# Patient Record
Sex: MALE | Race: BLACK OR AFRICAN AMERICAN | NOT HISPANIC OR LATINO | Employment: FULL TIME | URBAN - METROPOLITAN AREA
[De-identification: names, ages, dates, MRNs, and addresses within clinical notes are randomized per-mention and may not be internally consistent; named-entity substitution may affect disease eponyms.]

---

## 2019-05-19 ENCOUNTER — HOSPITAL ENCOUNTER (EMERGENCY)
Facility: HOSPITAL | Age: 44
Discharge: HOME | End: 2019-05-19
Attending: EMERGENCY MEDICINE
Payer: COMMERCIAL

## 2019-05-19 VITALS
BODY MASS INDEX: 35.37 KG/M2 | RESPIRATION RATE: 18 BRPM | HEIGHT: 76 IN | WEIGHT: 290.44 LBS | TEMPERATURE: 99 F | SYSTOLIC BLOOD PRESSURE: 157 MMHG | DIASTOLIC BLOOD PRESSURE: 90 MMHG | OXYGEN SATURATION: 98 % | HEART RATE: 84 BPM

## 2019-05-19 DIAGNOSIS — R05.9 COUGH: ICD-10-CM

## 2019-05-19 DIAGNOSIS — R91.1 LUNG NODULE SEEN ON IMAGING STUDY: ICD-10-CM

## 2019-05-19 DIAGNOSIS — S33.5XXA SPRAIN OF LOW BACK, INITIAL ENCOUNTER: Primary | ICD-10-CM

## 2019-05-19 DIAGNOSIS — R42 DIZZINESS: ICD-10-CM

## 2019-05-19 LAB — POCT GLUCOSE: 122 MG/DL (ref 70–110)

## 2019-05-19 PROCEDURE — 99284 EMERGENCY DEPT VISIT MOD MDM: CPT

## 2019-05-19 PROCEDURE — 93005 ELECTROCARDIOGRAM TRACING: CPT

## 2019-05-19 PROCEDURE — 93010 ELECTROCARDIOGRAM REPORT: CPT | Mod: ,,, | Performed by: INTERNAL MEDICINE

## 2019-05-19 PROCEDURE — 93010 EKG 12-LEAD: ICD-10-PCS | Mod: ,,, | Performed by: INTERNAL MEDICINE

## 2019-05-19 PROCEDURE — 82962 GLUCOSE BLOOD TEST: CPT

## 2019-05-19 PROCEDURE — 25000003 PHARM REV CODE 250: Performed by: NURSE PRACTITIONER

## 2019-05-19 RX ORDER — DICLOFENAC SODIUM 50 MG/1
50 TABLET, DELAYED RELEASE ORAL 3 TIMES DAILY PRN
Qty: 15 TABLET | Refills: 0 | Status: SHIPPED | OUTPATIENT
Start: 2019-05-19

## 2019-05-19 RX ORDER — DEXAMETHASONE 4 MG/1
4 TABLET ORAL DAILY
Qty: 5 TABLET | Refills: 0 | Status: SHIPPED | OUTPATIENT
Start: 2019-05-19 | End: 2019-05-24

## 2019-05-19 RX ORDER — PROMETHAZINE HYDROCHLORIDE AND DEXTROMETHORPHAN HYDROBROMIDE 6.25; 15 MG/5ML; MG/5ML
5 SYRUP ORAL 3 TIMES DAILY PRN
Qty: 180 ML | Refills: 0 | Status: SHIPPED | OUTPATIENT
Start: 2019-05-19 | End: 2019-05-29

## 2019-05-19 RX ORDER — ORPHENADRINE CITRATE 100 MG/1
100 TABLET, EXTENDED RELEASE ORAL 2 TIMES DAILY PRN
Qty: 20 TABLET | Refills: 0 | Status: SHIPPED | OUTPATIENT
Start: 2019-05-19 | End: 2019-05-29

## 2019-05-19 RX ORDER — HYDROCODONE BITARTRATE AND ACETAMINOPHEN 5; 325 MG/1; MG/1
1 TABLET ORAL
Status: COMPLETED | OUTPATIENT
Start: 2019-05-19 | End: 2019-05-19

## 2019-05-19 RX ADMIN — HYDROCODONE BITARTRATE AND ACETAMINOPHEN 1 TABLET: 5; 325 TABLET ORAL at 08:05

## 2019-05-20 ENCOUNTER — TELEPHONE (OUTPATIENT)
Dept: PULMONOLOGY | Facility: CLINIC | Age: 44
End: 2019-05-20

## 2019-05-20 ENCOUNTER — OFFICE VISIT (OUTPATIENT)
Dept: PULMONOLOGY | Facility: CLINIC | Age: 44
End: 2019-05-20
Payer: COMMERCIAL

## 2019-05-20 VITALS
SYSTOLIC BLOOD PRESSURE: 130 MMHG | WEIGHT: 289.88 LBS | RESPIRATION RATE: 18 BRPM | HEART RATE: 84 BPM | OXYGEN SATURATION: 96 % | HEIGHT: 76 IN | BODY MASS INDEX: 35.3 KG/M2 | DIASTOLIC BLOOD PRESSURE: 83 MMHG

## 2019-05-20 DIAGNOSIS — R06.2 WHEEZING: ICD-10-CM

## 2019-05-20 DIAGNOSIS — R91.1 LUNG NODULE: Primary | ICD-10-CM

## 2019-05-20 DIAGNOSIS — R06.83 SNORING: ICD-10-CM

## 2019-05-20 DIAGNOSIS — R05.2 SUBACUTE COUGH: ICD-10-CM

## 2019-05-20 DIAGNOSIS — R29.818 SUSPECTED SLEEP APNEA: ICD-10-CM

## 2019-05-20 PROCEDURE — 3008F PR BODY MASS INDEX (BMI) DOCUMENTED: ICD-10-PCS | Mod: CPTII,S$GLB,, | Performed by: INTERNAL MEDICINE

## 2019-05-20 PROCEDURE — 3008F BODY MASS INDEX DOCD: CPT | Mod: CPTII,S$GLB,, | Performed by: INTERNAL MEDICINE

## 2019-05-20 PROCEDURE — 99999 PR PBB SHADOW E&M-EST. PATIENT-LVL III: CPT | Mod: PBBFAC,,, | Performed by: INTERNAL MEDICINE

## 2019-05-20 PROCEDURE — 99204 OFFICE O/P NEW MOD 45 MIN: CPT | Mod: S$GLB,,, | Performed by: INTERNAL MEDICINE

## 2019-05-20 PROCEDURE — 99204 PR OFFICE/OUTPT VISIT, NEW, LEVL IV, 45-59 MIN: ICD-10-PCS | Mod: S$GLB,,, | Performed by: INTERNAL MEDICINE

## 2019-05-20 PROCEDURE — 99999 PR PBB SHADOW E&M-EST. PATIENT-LVL III: ICD-10-PCS | Mod: PBBFAC,,, | Performed by: INTERNAL MEDICINE

## 2019-05-20 RX ORDER — ALBUTEROL SULFATE 90 UG/1
2 AEROSOL, METERED RESPIRATORY (INHALATION) EVERY 4 HOURS PRN
Qty: 18 G | Refills: 6 | Status: SHIPPED | OUTPATIENT
Start: 2019-05-20 | End: 2020-05-19

## 2019-05-20 NOTE — ED PROVIDER NOTES
SCRIBE #1 NOTE: I, Lai Mosley, am scribing for, and in the presence of, Nba Katz NP. I have scribed the entire note.       History     Chief Complaint   Patient presents with    Back Pain     R lower back pain    Cough     productive cough x 2 weeks.      Review of patient's allergies indicates:  No Known Allergies      History of Present Illness     HPI    5/19/2019, 7:33 PM  History obtained from the patient      History of Present Illness: [place] is a 43 y.o. male patient who presents to the Emergency Department for evaluation of right lumbar pain which onset yesterday. He reports it onset after picking up a lot of heavy bags yesterday. He also complains of a cough x2 weeks and b/l ear drainage. Symptoms are constant and moderate in severity. No mitigating or exacerbating factors reported. There are no other sxs. Patient denies any fever, chills, n/v/d, abd pain, SOB, wheezing, stridor, numbness, weakness, saddle anesthesia, incontinence, dysuria, sore throat, and all other sxs at this time. No further complaints or concerns at this time.     Arrival mode: Personal vehicle    PCP: Primary Doctor No        Past Medical History:  Past Medical History:   Diagnosis Date    Back pain        Past Surgical History:  History reviewed. No pertinent surgical history.      Family History:  History reviewed. No pertinent family history.     Social History:  Social History     Tobacco Use    Smoking status: Never Smoker    Smokeless tobacco: Never Used   Substance and Sexual Activity    Alcohol use: Not Currently    Drug use: Not Currently    Sexual activity: Unknown        Review of Systems     Review of Systems   Constitutional: Negative for chills and fever.   HENT: Positive for ear discharge. Negative for congestion, ear pain, sneezing and sore throat.    Respiratory: Positive for cough. Negative for shortness of breath, wheezing and stridor.    Cardiovascular: Negative for chest pain.  "  Gastrointestinal: Negative for abdominal pain, diarrhea, nausea and vomiting.   Genitourinary: Negative for dysuria and hematuria.   Musculoskeletal: Positive for back pain.   Skin: Negative for rash.   Neurological: Negative for dizziness, weakness, light-headedness, numbness and headaches.        (-) Incontinence, saddle anesthesia   Hematological: Does not bruise/bleed easily.   All other systems reviewed and are negative.       Physical Exam     Initial Vitals [05/19/19 1920]   BP Pulse Resp Temp SpO2   (!) 162/88 82 18 98.6 °F (37 °C) 95 %      MAP       --          Physical Exam  Nursing Notes and Vital Signs Reviewed.  Constitutional: Patient is in no acute distress. Well-developed and well-nourished.  Head: Atraumatic. Normocephalic.  Eyes: PERRL. EOM intact. Conjunctivae are not pale. No scleral icterus.  ENT: Mucous membranes are moist. Oropharynx is clear and symmetric.  Increased cerumen to b/l ear canals.  Neck: Supple. Full ROM. No lymphadenopathy.  Cardiovascular: Regular rate. Regular rhythm. No murmurs, rubs, or gallops. Distal pulses are 2+ and symmetric.  Pulmonary/Chest: No respiratory distress. Clear to auscultation bilaterally. No wheezing or rales.  Abdominal: Soft and non-distended.  There is no tenderness.  No rebound, guarding, or rigidity.   Musculoskeletal: Moves all extremities. No obvious deformities. No edema. No calf tenderness. Right sided lumbar tenderness.  Skin: Warm and dry.  Neurological:  Alert, awake, and appropriate.  Normal speech.  No acute focal neurological deficits are appreciated.  Psychiatric: Normal affect. Good eye contact. Appropriate in content.     ED Course   Procedures  ED Vital Signs:  Vitals:    05/19/19 1920 05/19/19 2053   BP: (!) 162/88 (!) 157/90   Pulse: 82 84   Resp: 18 18   Temp: 98.6 °F (37 °C)    TempSrc: Oral    SpO2: 95% 98%   Weight: 131.8 kg (290 lb 7.3 oz)    Height: 6' 4" (1.93 m)        Abnormal Lab Results:  Labs Reviewed   POCT GLUCOSE - " Abnormal; Notable for the following components:       Result Value    POCT Glucose 122 (*)     All other components within normal limits        All Lab Results:  Results for orders placed or performed during the hospital encounter of 05/19/19   POCT glucose   Result Value Ref Range    POCT Glucose 122 (H) 70 - 110 mg/dL         Imaging Results:  Imaging Results          X-Ray Chest PA And Lateral (Final result)  Result time 05/19/19 20:10:05    Final result by Hesham Whalen MD (05/19/19 20:10:05)                 Impression:      1.6 cm nodule projecting over the right mid lung.      Electronically signed by: Hesham Whalen MD  Date:    05/19/2019  Time:    20:10             Narrative:    EXAMINATION:  XR CHEST PA AND LATERAL    CLINICAL HISTORY:  Cough    COMPARISON:  None    FINDINGS:  The heart is normal in size.  There is a 1.6 cm nodule projecting over the right mid lung.  Lung fields otherwise clear.                               X-Ray Lumbar Spine Complete 5 View (Final result)  Result time 05/19/19 20:09:33    Final result by Hesham Whalen MD (05/19/19 20:09:33)                 Impression:      Normal study.      Electronically signed by: Hesham Whalen MD  Date:    05/19/2019  Time:    20:09             Narrative:    EXAMINATION:  XR LUMBAR SPINE COMPLETE 5 VIEW    CLINICAL HISTORY:  Low back pain, <6wks, no red flags, no prior management;    FINDINGS:  No fracture, subluxation or disc space narrowing is identified.                                 The EKG was ordered, reviewed, and independently interpreted by the ED provider.  Interpretation time: 2000  Rate: 83 BPM  Rhythm: normal sinus rhythm  Interpretation: Rightward axis. Pulmonary disease pattern. No STEMI.         The Emergency Provider reviewed the vital signs and test results, which are outlined above.     ED Discussion     8:42 PM: Reassessed pt at this time. Discussed the lung nodule with Dr. Flores. He recommends that the patient follow up with his  PCP and states that no further ER interventions are needed. Explained this to the patient, who agrees with and expresses understanding. Pt states his condition has improved at this time. Discussed with pt all pertinent ED information and results. Discussed pt dx and plan of tx. Gave pt all f/u and return to the ED instructions. All questions and concerns were addressed at this time. Pt expresses understanding of information and instructions, and is comfortable with plan to discharge. Pt is stable for discharge.    I discussed with patient and/or family/caretaker that evaluation in the ED does not suggest any emergent or life threatening medical conditions requiring immediate intervention beyond what was provided in the ED, and I believe patient is safe for discharge.  Regardless, an unremarkable evaluation in the ED does not preclude the development or presence of a serious of life threatening condition. As such, patient was instructed to return immediately for any worsening or change in current symptoms.      ED Medication(s):  Medications   HYDROcodone-acetaminophen 5-325 mg per tablet 1 tablet (1 tablet Oral Given 5/19/19 2006)       Discharge Medication List as of 5/19/2019  8:42 PM      START taking these medications    Details   carbamide peroxide (DEBROX) 6.5 % otic solution Place 10 drops into both ears as needed., Starting Sun 5/19/2019, Print      dexamethasone (DECADRON) 4 MG Tab Take 1 tablet (4 mg total) by mouth once daily. for 5 days, Starting Sun 5/19/2019, Until Fri 5/24/2019, Print      diclofenac (VOLTAREN) 50 MG EC tablet Take 1 tablet (50 mg total) by mouth 3 (three) times daily as needed., Starting Sun 5/19/2019, Print      orphenadrine (NORFLEX) 100 mg tablet Take 1 tablet (100 mg total) by mouth 2 (two) times daily as needed for Muscle spasms., Starting Sun 5/19/2019, Until Wed 5/29/2019, Print      promethazine-dextromethorphan (PROMETHAZINE-DM) 6.25-15 mg/5 mL Syrp Take 5 mLs by mouth 3  (three) times daily as needed., Starting Sun 5/19/2019, Until Wed 5/29/2019, Print             Follow-up Information     [facility]    Specialty:  Emergency Medicine  Why:  As needed, If symptoms worsen  Contact information:  [address]  [facility] [address] [phone]           PCP.                       Medical Decision Making:   Clinical Tests:   Lab Tests: Ordered and Reviewed  Radiological Study: Ordered and Reviewed  Medical Tests: Ordered and Reviewed             Scribe Attestation:   Scribe #1: I performed the above scribed service and the documentation accurately describes the services I performed. I attest to the accuracy of the note.     Attending:   Physician Attestation Statement for Scribe #1: I, Nba Katz NP, personally performed the services described in this documentation, as scribed by Lai Mosley, in my presence, and it is both accurate and complete.           Clinical Impression       ICD-10-CM ICD-9-CM   1. Sprain of low back, initial encounter S33.5XXA 846.9   2. Cough R05 786.2   3. Dizziness R42 780.4   4. Lung nodule seen on imaging study R91.1 793.11       Disposition:   Disposition: Discharged  Condition: Stable         Nba Katz NP  05/21/19 0129

## 2019-05-20 NOTE — ED NOTES
Patient's wife requesting to speak to charge nurse while he was in xray. She was requesting for patient to be placed in a room because he is making work related phone calls. They were located in treatment lounge at the time. I explained to her the purpose of the treatment lounge to help with the ED flow and offered to place the patient in a qtrack bed once one opens up. In the meantime the disposition room was offered to the patient, which he was placed in when he returned from radiology. The wife continued to argue with me at the nurses station after we reached this resolution. She asked for my name, which I provided her my first name and my title. She asked for my last name, which I informed her that I am not required to provide my last name for safety reasons but I will be able to be identified by my name, title, and department. After continuing to argue, I advised the wife that she can go to the disposition room, the lobby, or that security will escort her out.

## 2019-05-20 NOTE — TELEPHONE ENCOUNTER
Scheduled patient for appt. With Dr. Guallpa same day.   ----- Message from Sandrita Patel sent at 5/20/2019  8:40 AM CDT -----  Contact: April-Spouse  Type:  Same Day Appointment Request    Caller is requesting a same day appointment.  Caller declined first available appointment listed below.    Name of Caller:April-wife  When is the first available appointment?06/19/19  Symptoms:ER f/u  Best Call Back Number:[phone]  Additional Information: patient was advised that he had a spot on his lungs and needed to be seen by a pulmonologist. Patient was not treated.

## 2019-05-20 NOTE — PROGRESS NOTES
Initial Outpatient Pulmonary Evaluation       SUBJECTIVE:     Chief Complaint   Patient presents with    spot on lung    Pleural Effusion    Wheezing       History of Present Illness:    Patient is a 43 y.o. male presenting for evaluation of incidental right midlung nodule on chest x-ray.    Patient presented to the emergency room yesterday complaining of lumbar back pain, he has a history of subacute 3-4 weeks cough with sputum production, intermittent wheezing and as per his wife gargling and snoring during sleep.  He has daytime fatigue and daytime sleepiness.    Never smoker.    [place] in [employer] [place]    Denies personal history of cancer or family history of cancer.        Review of Systems   Constitutional: Positive for fatigue. Negative for fever and chills.   HENT: Negative for nosebleeds.    Eyes: Negative for redness.   Respiratory: Positive for snoring, cough, shortness of breath and wheezing. Negative for choking.    Genitourinary: Negative for hematuria.   Endocrine: Negative for cold intolerance.    Musculoskeletal: Positive for arthralgias and gait problem.   Gastrointestinal: Negative for vomiting.   Neurological: Negative for syncope.   Hematological: Negative for adenopathy.   Psychiatric/Behavioral: Positive for sleep disturbance. Negative for confusion.        Daytime sleepiness       Review of patient's allergies indicates:  No Known Allergies    Current Outpatient Medications   Medication Sig Dispense Refill    carbamide peroxide (DEBROX) 6.5 % otic solution Place 10 drops into both ears as needed. 15 mL 0    dexamethasone (DECADRON) 4 MG Tab Take 1 tablet (4 mg total) by mouth once daily. for 5 days 5 tablet 0    diclofenac (VOLTAREN) 50 MG EC tablet Take 1 tablet (50 mg total) by mouth 3 (three) times daily as needed. 15 tablet 0    orphenadrine (NORFLEX) 100 mg tablet Take 1 tablet (100 mg total) by mouth 2 (two) times daily as  "needed for Muscle spasms. 20 tablet 0    promethazine-dextromethorphan (PROMETHAZINE-DM) 6.25-15 mg/5 mL Syrp Take 5 mLs by mouth 3 (three) times daily as needed. 180 mL 0    albuterol (PROVENTIL/VENTOLIN HFA) 90 mcg/actuation inhaler Inhale 2 puffs into the lungs every 4 (four) hours as needed for Wheezing. Rescue 18 g 6     No current facility-administered medications for this visit.        Past Medical History:   Diagnosis Date    Back pain      History reviewed. No pertinent surgical history.  History reviewed. No pertinent family history.  Social History     Tobacco Use    Smoking status: Never Smoker    Smokeless tobacco: Never Used   Substance Use Topics    Alcohol use: Not Currently    Drug use: Not Currently          OBJECTIVE:     Vital Signs (Most Recent)  Vital Signs  Pulse: 84  Resp: 18  SpO2: 96 %  BP: 130/83  Height and Weight  Height: 6' 4" (193 cm)  Weight: 131.5 kg (289 lb 14.5 oz)  BSA (Calculated - sq m): 2.66 sq meters  BMI (Calculated): 35.4  Weight in (lb) to have BMI = 25: 205]  Wt Readings from Last 2 Encounters:   05/20/19 131.5 kg (289 lb 14.5 oz)   05/19/19 131.8 kg (290 lb 7.3 oz)         Physical Exam:  Physical Exam   Constitutional: He is oriented to person, place, and time. He appears well-developed.   HENT:   Head: Normocephalic.   Mouth/Throat: Mallampati Score: IV.   Neck: Neck supple.   Seventeen and half neck circumference   Cardiovascular: Normal rate, regular rhythm and normal heart sounds.   Pulmonary/Chest: Normal expansion and effort normal. No stridor. No respiratory distress. He has no wheezes. He has no rhonchi. He exhibits no tenderness.   Abdominal: Soft. He exhibits no distension. There is no tenderness.   Musculoskeletal: He exhibits no edema, tenderness or deformity.   Lymphadenopathy:     He has no cervical adenopathy.   Neurological: He is alert and oriented to person, place, and time. Gait normal.   Skin: Skin is warm. No cyanosis.   Psychiatric: He has a " normal mood and affect. His behavior is normal. Judgment and thought content normal.   Nursing note and vitals reviewed.      Laboratory  No results found for: WBC, RBC, HGB, HCT, MCV, MCH, MCHC, RDW, PLT, MPV, GRAN, LYMPH, MONO, EOS, BASO, EOSINOPHIL, BASOPHIL    BMP  No results found for: NA, K, CL, CO2, BUN, CREATININE, CALCIUM, ANIONGAP, ESTGFRAFRICA, EGFRNONAA, AST, ALT, PROT    No results found for: BNP    No results found for: TSH    No results found for: SEDRATE    No results found for: CRP      Diagnostic Results:  I have personally reviewed today the following studies :    Chest x-ray Right midlung nodule 1.6 cm.      Patient states he previously had normal chest x-rays.  But we do not have a chest x-ray in our system.    ASSESSMENT/PLAN:     Lung nodule  -     CT Chest With Contrast; Future; Expected date: 05/20/2019  -     CBC auto differential; Future; Expected date: 05/20/2019  -     Basic metabolic panel; Future; Expected date: 05/20/2019    Subacute cough  -     Complete PFT with bronchodilator; Future; Expected date: 06/03/2019  -     albuterol (PROVENTIL/VENTOLIN HFA) 90 mcg/actuation inhaler; Inhale 2 puffs into the lungs every 4 (four) hours as needed for Wheezing. Rescue  Dispense: 18 g; Refill: 6    Wheezing  -     Complete PFT with bronchodilator; Future; Expected date: 06/03/2019  -     albuterol (PROVENTIL/VENTOLIN HFA) 90 mcg/actuation inhaler; Inhale 2 puffs into the lungs every 4 (four) hours as needed for Wheezing. Rescue  Dispense: 18 g; Refill: 6    Suspected sleep apnea    Snoring    Incidental lung nodule 1.6 cm will be further defined by a CT scan of the chest.    PFT with bronchodilator.  Start short-acting bronchodilator p.r.n..    Will discuss on the next visit home sleep study.    Further recommendation to follow above workup.    Follow up in about 2 weeks (around 6/3/2019).    This note was prepared using voice recognition system and is likely to have sound alike errors that  may have been overlooked even after proof reading.  Please call me with any questions    Discussed diagnosis, its evaluation, treatment and usual course. All questions answered.    Thank you for the courtesy of participating in the care of this patient    Kassidy Guallpa MD

## 2019-06-07 ENCOUNTER — TELEPHONE (OUTPATIENT)
Dept: PULMONOLOGY | Facility: CLINIC | Age: 44
End: 2019-06-07

## 2019-06-07 NOTE — TELEPHONE ENCOUNTER
Called patient and reschedule his pft.   Patient confirmed time and date ----- Message from Shaila Jack sent at 6/7/2019  1:49 PM CDT -----  Contact: [phone]  Would like to consult with the nurse, patient would like to schedule an pulmonary  Labs  Appt, patient would like to speak with the nurse concerning this appt, please call back at [phone], thanks sj

## 2019-06-07 NOTE — TELEPHONE ENCOUNTER
Called patient to inform him that we would need to move his appt. Due to his testing not being done. Patients VM was full and could not leave message.

## 2019-06-12 ENCOUNTER — HOSPITAL ENCOUNTER (OUTPATIENT)
Dept: RADIOLOGY | Facility: HOSPITAL | Age: 44
Discharge: HOME | End: 2019-06-12
Attending: INTERNAL MEDICINE
Payer: COMMERCIAL

## 2019-06-12 DIAGNOSIS — R91.1 LUNG NODULE: ICD-10-CM

## 2019-06-12 PROCEDURE — 71250 CT THORAX DX C-: CPT | Mod: TC

## 2019-06-13 ENCOUNTER — NURSE TRIAGE (OUTPATIENT)
Dept: ADMINISTRATIVE | Facility: CLINIC | Age: 44
End: 2019-06-13

## 2019-06-13 ENCOUNTER — TELEPHONE (OUTPATIENT)
Dept: PULMONOLOGY | Facility: CLINIC | Age: 44
End: 2019-06-13

## 2019-06-13 DIAGNOSIS — R91.1 LUNG NODULE: Primary | ICD-10-CM

## 2019-06-14 ENCOUNTER — LAB VISIT (OUTPATIENT)
Dept: LAB | Facility: HOSPITAL | Age: 44
End: 2019-06-14
Attending: INTERNAL MEDICINE
Payer: COMMERCIAL

## 2019-06-14 ENCOUNTER — TELEPHONE (OUTPATIENT)
Dept: PULMONOLOGY | Facility: CLINIC | Age: 44
End: 2019-06-14

## 2019-06-14 DIAGNOSIS — R91.1 LUNG NODULE: ICD-10-CM

## 2019-06-14 LAB
ANION GAP SERPL CALC-SCNC: 10 MMOL/L (ref 8–16)
BUN SERPL-MCNC: 8 MG/DL (ref 6–20)
CALCIUM SERPL-MCNC: 9.7 MG/DL (ref 8.7–10.5)
CHLORIDE SERPL-SCNC: 104 MMOL/L (ref 95–110)
CO2 SERPL-SCNC: 25 MMOL/L (ref 23–29)
CREAT SERPL-MCNC: 0.9 MG/DL (ref 0.5–1.4)
EST. GFR  (AFRICAN AMERICAN): >60 ML/MIN/1.73 M^2
EST. GFR  (NON AFRICAN AMERICAN): >60 ML/MIN/1.73 M^2
GLUCOSE SERPL-MCNC: 101 MG/DL (ref 70–110)
POTASSIUM SERPL-SCNC: 4.1 MMOL/L (ref 3.5–5.1)
SODIUM SERPL-SCNC: 139 MMOL/L (ref 136–145)

## 2019-06-14 PROCEDURE — 36415 COLL VENOUS BLD VENIPUNCTURE: CPT

## 2019-06-14 PROCEDURE — 80048 BASIC METABOLIC PNL TOTAL CA: CPT

## 2019-06-14 NOTE — TELEPHONE ENCOUNTER
Returned patients phone call and informed him that a message has been sent to Dr. Guallpa to call him when he gets into the clinic to go over CT results with him.   Patient stated understanding and had no questions. ----- Message from Lynn Ramires sent at 6/14/2019  8:19 AM CDT -----  Contact: PT  He's calling in regards to results ,pls call pt back at [phone] (home)

## 2019-06-14 NOTE — TELEPHONE ENCOUNTER
Called patient discussed CT scan results with him.  Discussed with him that he needs to proceed with PET scan.  Thank you

## 2019-06-14 NOTE — TELEPHONE ENCOUNTER
Patient called to report the following:     -would like results of CT scan   -advised to f/u with provider     Reason for Disposition   Caller requesting lab results    Protocols used: PCP CALL - NO TRIAGE-A-AH

## 2019-06-24 ENCOUNTER — TELEPHONE (OUTPATIENT)
Dept: RADIOLOGY | Facility: HOSPITAL | Age: 44
End: 2019-06-24

## 2019-06-25 ENCOUNTER — TELEPHONE (OUTPATIENT)
Dept: RADIOLOGY | Facility: HOSPITAL | Age: 44
End: 2019-06-25

## 2019-06-25 ENCOUNTER — TELEPHONE (OUTPATIENT)
Dept: PULMONOLOGY | Facility: CLINIC | Age: 44
End: 2019-06-25

## 2019-08-08 ENCOUNTER — TELEPHONE (OUTPATIENT)
Dept: PULMONOLOGY | Facility: CLINIC | Age: 44
End: 2019-08-08

## 2019-08-08 NOTE — TELEPHONE ENCOUNTER
----- Message from Serenity Fuentes sent at 8/8/2019  1:31 PM CDT -----  Contact: self  needs pft order transferred to [facility] imaging services...[phone] (home)

## 2023-01-27 DIAGNOSIS — M54.50 ACUTE BILATERAL LOW BACK PAIN WITHOUT SCIATICA: Primary | ICD-10-CM

## 2023-07-07 ENCOUNTER — PATIENT MESSAGE (OUTPATIENT)
Dept: INFECTIOUS DISEASES | Facility: CLINIC | Age: 48
End: 2023-07-07
Payer: COMMERCIAL